# Patient Record
Sex: FEMALE | Race: WHITE | NOT HISPANIC OR LATINO | Employment: FULL TIME | URBAN - METROPOLITAN AREA
[De-identification: names, ages, dates, MRNs, and addresses within clinical notes are randomized per-mention and may not be internally consistent; named-entity substitution may affect disease eponyms.]

---

## 2023-10-30 ENCOUNTER — ANCILLARY PROCEDURE (OUTPATIENT)
Dept: RADIOLOGY | Facility: CLINIC | Age: 43
End: 2023-10-30
Payer: COMMERCIAL

## 2023-10-30 DIAGNOSIS — Z12.31 SCREENING MAMMOGRAM FOR BREAST CANCER: ICD-10-CM

## 2023-10-30 PROCEDURE — 77063 BREAST TOMOSYNTHESIS BI: CPT

## 2023-10-30 PROCEDURE — 77063 BREAST TOMOSYNTHESIS BI: CPT | Performed by: RADIOLOGY

## 2023-10-30 PROCEDURE — 77067 SCR MAMMO BI INCL CAD: CPT | Performed by: RADIOLOGY

## 2024-04-22 ENCOUNTER — OFFICE VISIT (OUTPATIENT)
Dept: PRIMARY CARE | Facility: CLINIC | Age: 44
End: 2024-04-22
Payer: COMMERCIAL

## 2024-04-22 ENCOUNTER — LAB (OUTPATIENT)
Dept: LAB | Facility: LAB | Age: 44
End: 2024-04-22
Payer: COMMERCIAL

## 2024-04-22 VITALS — SYSTOLIC BLOOD PRESSURE: 120 MMHG | BODY MASS INDEX: 34.39 KG/M2 | DIASTOLIC BLOOD PRESSURE: 74 MMHG | WEIGHT: 188 LBS

## 2024-04-22 DIAGNOSIS — E78.5 HYPERLIPIDEMIA, UNSPECIFIED HYPERLIPIDEMIA TYPE: ICD-10-CM

## 2024-04-22 DIAGNOSIS — Z00.00 HEALTH CARE MAINTENANCE: Primary | ICD-10-CM

## 2024-04-22 DIAGNOSIS — E55.9 VITAMIN D DEFICIENCY: ICD-10-CM

## 2024-04-22 DIAGNOSIS — Z00.00 HEALTH CARE MAINTENANCE: ICD-10-CM

## 2024-04-22 DIAGNOSIS — J45.20 ASTHMA IN ADULT, MILD INTERMITTENT, UNCOMPLICATED (HHS-HCC): ICD-10-CM

## 2024-04-22 DIAGNOSIS — Z12.39 ENCOUNTER FOR SCREENING FOR MALIGNANT NEOPLASM OF BREAST, UNSPECIFIED SCREENING MODALITY: ICD-10-CM

## 2024-04-22 PROBLEM — E66.9 OBESITY (BMI 30-39.9): Status: ACTIVE | Noted: 2024-04-22

## 2024-04-22 LAB
25(OH)D3 SERPL-MCNC: 29 NG/ML (ref 30–100)
ALBUMIN SERPL BCP-MCNC: 4.5 G/DL (ref 3.4–5)
ALP SERPL-CCNC: 64 U/L (ref 33–110)
ALT SERPL W P-5'-P-CCNC: 13 U/L (ref 7–45)
ANION GAP SERPL CALC-SCNC: 14 MMOL/L (ref 10–20)
AST SERPL W P-5'-P-CCNC: 13 U/L (ref 9–39)
BILIRUB SERPL-MCNC: 0.8 MG/DL (ref 0–1.2)
BUN SERPL-MCNC: 11 MG/DL (ref 6–23)
CALCIUM SERPL-MCNC: 9.8 MG/DL (ref 8.6–10.6)
CHLORIDE SERPL-SCNC: 101 MMOL/L (ref 98–107)
CHOLEST SERPL-MCNC: 216 MG/DL (ref 0–199)
CHOLESTEROL/HDL RATIO: 3.5
CO2 SERPL-SCNC: 30 MMOL/L (ref 21–32)
CREAT SERPL-MCNC: 0.7 MG/DL (ref 0.5–1.05)
EGFRCR SERPLBLD CKD-EPI 2021: >90 ML/MIN/1.73M*2
ERYTHROCYTE [DISTWIDTH] IN BLOOD BY AUTOMATED COUNT: 12.3 % (ref 11.5–14.5)
EST. AVERAGE GLUCOSE BLD GHB EST-MCNC: 94 MG/DL
GLUCOSE SERPL-MCNC: 80 MG/DL (ref 74–99)
HBA1C MFR BLD: 4.9 %
HCT VFR BLD AUTO: 42.5 % (ref 36–46)
HDLC SERPL-MCNC: 62.1 MG/DL
HGB BLD-MCNC: 14 G/DL (ref 12–16)
LDLC SERPL CALC-MCNC: 135 MG/DL
MCH RBC QN AUTO: 29.5 PG (ref 26–34)
MCHC RBC AUTO-ENTMCNC: 32.9 G/DL (ref 32–36)
MCV RBC AUTO: 90 FL (ref 80–100)
NON HDL CHOLESTEROL: 154 MG/DL (ref 0–149)
NRBC BLD-RTO: 0 /100 WBCS (ref 0–0)
PLATELET # BLD AUTO: 253 X10*3/UL (ref 150–450)
POTASSIUM SERPL-SCNC: 4.5 MMOL/L (ref 3.5–5.3)
PROT SERPL-MCNC: 7.2 G/DL (ref 6.4–8.2)
RBC # BLD AUTO: 4.75 X10*6/UL (ref 4–5.2)
SODIUM SERPL-SCNC: 140 MMOL/L (ref 136–145)
TRIGL SERPL-MCNC: 95 MG/DL (ref 0–149)
TSH SERPL-ACNC: 3.66 MIU/L (ref 0.44–3.98)
VLDL: 19 MG/DL (ref 0–40)
WBC # BLD AUTO: 8.9 X10*3/UL (ref 4.4–11.3)

## 2024-04-22 PROCEDURE — 36415 COLL VENOUS BLD VENIPUNCTURE: CPT

## 2024-04-22 PROCEDURE — 99396 PREV VISIT EST AGE 40-64: CPT | Performed by: STUDENT IN AN ORGANIZED HEALTH CARE EDUCATION/TRAINING PROGRAM

## 2024-04-22 PROCEDURE — 85027 COMPLETE CBC AUTOMATED: CPT

## 2024-04-22 PROCEDURE — 84443 ASSAY THYROID STIM HORMONE: CPT

## 2024-04-22 PROCEDURE — 1036F TOBACCO NON-USER: CPT | Performed by: STUDENT IN AN ORGANIZED HEALTH CARE EDUCATION/TRAINING PROGRAM

## 2024-04-22 PROCEDURE — 80053 COMPREHEN METABOLIC PANEL: CPT

## 2024-04-22 PROCEDURE — 83036 HEMOGLOBIN GLYCOSYLATED A1C: CPT

## 2024-04-22 PROCEDURE — 80061 LIPID PANEL: CPT

## 2024-04-22 PROCEDURE — 82306 VITAMIN D 25 HYDROXY: CPT

## 2024-04-22 RX ORDER — ACETAMINOPHEN 325 MG/1
TABLET ORAL EVERY 4 HOURS PRN
COMMUNITY
Start: 2020-03-13

## 2024-04-22 RX ORDER — ALBUTEROL SULFATE 90 UG/1
2 AEROSOL, METERED RESPIRATORY (INHALATION) EVERY 4 HOURS PRN
COMMUNITY
Start: 2022-10-07

## 2024-04-22 RX ORDER — METHYLPREDNISOLONE 4 MG/1
TABLET ORAL
Qty: 21 TABLET | Refills: 0 | Status: SHIPPED | OUTPATIENT
Start: 2024-04-22 | End: 2024-04-29

## 2024-04-22 RX ORDER — IBUPROFEN 600 MG/1
TABLET ORAL EVERY 6 HOURS PRN
COMMUNITY
Start: 2020-03-13 | End: 2024-04-22 | Stop reason: ALTCHOICE

## 2024-04-22 RX ORDER — CETIRIZINE HYDROCHLORIDE, PSEUDOEPHEDRINE HYDROCHLORIDE 5; 120 MG/1; MG/1
TABLET, FILM COATED, EXTENDED RELEASE ORAL
COMMUNITY
End: 2024-04-22 | Stop reason: ALTCHOICE

## 2024-04-22 ASSESSMENT — PROMIS GLOBAL HEALTH SCALE
RATE_MENTAL_HEALTH: GOOD
RATE_GENERAL_HEALTH: GOOD
CARRYOUT_PHYSICAL_ACTIVITIES: COMPLETELY
RATE_AVERAGE_PAIN: 0
CARRYOUT_SOCIAL_ACTIVITIES: VERY GOOD
RATE_SOCIAL_SATISFACTION: GOOD
EMOTIONAL_PROBLEMS: SOMETIMES
RATE_PHYSICAL_HEALTH: GOOD
RATE_QUALITY_OF_LIFE: GOOD
RATE_AVERAGE_FATIGUE: MILD

## 2024-04-22 NOTE — PROGRESS NOTES
Annual exam  anSubjective   Sherin Levi is a 44 y.o. female.  Who comes in for annual exam and blood work.  She notes that she has not been sleeping too well likely due to stress related work.  She does have an outlet and has been seeing a therapist about twice a month.  He does not take any antidepressant/anxiety medication and nor is interested in taking any at this time.  She drinks alcohol occasionally, denies smoking, and endorses caffeine intake about 12 ounces a day.  She says she needs to follow-up with a gynecologist who she last saw in 2022.  She is up-to-date with her vaccinations and will likely need a routine colonoscopy next year.      Chief Complaint:  Annual Exam and needs labwork    HPI    ROS    Objective   Physical Exam    General:  Pleasant and cooperative. No apparent distress.  HEENT:  Normocephalic, atraumatic  Chest:  Clear to auscultation. Bilateral and appropriate chest rise  CV:  Regular rate and rhythm.    Abdomen: Abdomen is soft, non-tender, non-distended. BS +   Extremities:  No lower extremity edema or cyanosis.   Neurological:  Conversing and answering questions appropriately   Skin:  Warm and dry.      Lab Review:       Assessment/Plan   The primary encounter diagnosis was Health care maintenance. Diagnoses of Vitamin D deficiency, Encounter for screening for malignant neoplasm of breast, unspecified screening modality, Hyperlipidemia, unspecified hyperlipidemia type, and Asthma in adult, mild intermittent, uncomplicated ([facility]) were also pertinent to this visit.d needs lab work done    Asthma: Intermittently well controlled, does need to use a rescue inhaler, potentially referral to pulm in future, she will call us if symptoms are worsening     #HLD: Advised increased efforts at diet, exercise, weight loss. Check lipid panel today, potential initiation of cholesterol medication     #Health maintenance: Discussed importance of aerobic exercise, healthy diet. Routine labs  today she follows with GYN for routine care needs foollowup. UTD with TDaP (2020). Advised yearly flu shot and COVID-19 vaccine. Mammogram repeat     RTC 1 year or PRN    Patient seen in conjunction with resident physician.  No pending lab work today, discussed lifestyle modifications.  Intermittently gets asthma exacerbations, Medrol Dosepak as needed, discussed long-acting agent however defers well monitor

## 2024-09-25 DIAGNOSIS — J06.9 VIRAL UPPER RESPIRATORY TRACT INFECTION: Primary | ICD-10-CM

## 2024-09-25 RX ORDER — GUAIFENESIN/DEXTROMETHORPHAN 100-10MG/5
5 SYRUP ORAL 4 TIMES DAILY PRN
Qty: 118 ML | Refills: 0 | Status: SHIPPED | OUTPATIENT
Start: 2024-09-25 | End: 2024-09-25

## 2024-09-25 RX ORDER — GUAIFENESIN/DEXTROMETHORPHAN 100-10MG/5
5 SYRUP ORAL 3 TIMES DAILY PRN
Qty: 118 ML | Refills: 0 | Status: SHIPPED | OUTPATIENT
Start: 2024-09-25 | End: 2024-10-05

## 2024-09-25 RX ORDER — ALBUTEROL SULFATE 90 UG/1
2 INHALANT RESPIRATORY (INHALATION) EVERY 4 HOURS PRN
Qty: 18 G | Refills: 2 | Status: SHIPPED | OUTPATIENT
Start: 2024-09-25

## 2024-12-26 ENCOUNTER — HOSPITAL ENCOUNTER (OUTPATIENT)
Dept: RADIOLOGY | Facility: CLINIC | Age: 44
Discharge: HOME | End: 2024-12-26
Payer: COMMERCIAL

## 2024-12-26 DIAGNOSIS — Z12.39 ENCOUNTER FOR SCREENING FOR MALIGNANT NEOPLASM OF BREAST, UNSPECIFIED SCREENING MODALITY: ICD-10-CM

## 2025-10-31 ENCOUNTER — APPOINTMENT (OUTPATIENT)
Dept: PRIMARY CARE | Facility: CLINIC | Age: 45
End: 2025-10-31
Payer: COMMERCIAL